# Patient Record
Sex: FEMALE | Race: BLACK OR AFRICAN AMERICAN | NOT HISPANIC OR LATINO | Employment: FULL TIME | URBAN - METROPOLITAN AREA
[De-identification: names, ages, dates, MRNs, and addresses within clinical notes are randomized per-mention and may not be internally consistent; named-entity substitution may affect disease eponyms.]

---

## 2018-06-19 ENCOUNTER — CLINICAL SUPPORT (OUTPATIENT)
Dept: OCCUPATIONAL MEDICINE | Facility: CLINIC | Age: 28
End: 2018-06-19

## 2018-06-19 DIAGNOSIS — Z02.1 ENCOUNTER FOR PRE-EMPLOYMENT EXAMINATION: Primary | ICD-10-CM

## 2018-06-19 PROCEDURE — 99499 UNLISTED E&M SERVICE: CPT | Mod: S$GLB,,, | Performed by: PREVENTIVE MEDICINE

## 2018-06-19 PROCEDURE — 86580 TB INTRADERMAL TEST: CPT | Mod: S$GLB,,, | Performed by: PREVENTIVE MEDICINE

## 2018-06-19 PROCEDURE — 90746 HEPB VACCINE 3 DOSE ADULT IM: CPT | Mod: S$GLB,,, | Performed by: PREVENTIVE MEDICINE

## 2024-08-02 DIAGNOSIS — G89.29 CHRONIC RIGHT SHOULDER PAIN: Primary | ICD-10-CM

## 2024-08-02 DIAGNOSIS — M25.511 CHRONIC RIGHT SHOULDER PAIN: Primary | ICD-10-CM

## 2024-08-07 ENCOUNTER — OFFICE VISIT (OUTPATIENT)
Dept: ORTHOPEDICS | Facility: CLINIC | Age: 34
End: 2024-08-07
Payer: COMMERCIAL

## 2024-08-07 DIAGNOSIS — M25.511 ACUTE PAIN OF RIGHT SHOULDER: Primary | ICD-10-CM

## 2024-08-07 PROCEDURE — 99204 OFFICE O/P NEW MOD 45 MIN: CPT | Mod: S$GLB,,,

## 2024-08-07 PROCEDURE — 97110 THERAPEUTIC EXERCISES: CPT | Mod: GP,S$GLB,,

## 2024-08-07 PROCEDURE — 99999 PR PBB SHADOW E&M-EST. PATIENT-LVL III: CPT | Mod: PBBFAC,,,

## 2024-08-07 RX ORDER — NAPROXEN 500 MG/1
500 TABLET ORAL 2 TIMES DAILY WITH MEALS
Qty: 60 TABLET | Refills: 2 | Status: SHIPPED | OUTPATIENT
Start: 2024-08-07

## 2024-08-21 ENCOUNTER — CLINICAL SUPPORT (OUTPATIENT)
Dept: REHABILITATION | Facility: HOSPITAL | Age: 34
End: 2024-08-21
Payer: MEDICAID

## 2024-08-21 DIAGNOSIS — M25.511 ACUTE PAIN OF RIGHT SHOULDER: ICD-10-CM

## 2024-08-21 DIAGNOSIS — R53.1 WEAKNESS: Primary | ICD-10-CM

## 2024-08-21 DIAGNOSIS — M25.60 STIFFNESS IN JOINT: ICD-10-CM

## 2024-08-21 PROCEDURE — 97530 THERAPEUTIC ACTIVITIES: CPT | Mod: PO

## 2024-08-21 PROCEDURE — 97165 OT EVAL LOW COMPLEX 30 MIN: CPT | Mod: PO

## 2024-08-21 NOTE — PLAN OF CARE
[facility]  Occupational Therapy - Shoulder Evaluation       Evaluation Date: 8/21/2024  Patient: Deanna Kim  YOB: 1990  Chart Number: [id number]  Referring Physician: Josephine Sánchez P*  Diagnosis:   1. Weakness        2. Acute pain of right shoulder  Ambulatory referral/consult to Physical/Occupational Therapy      3. Stiffness in joint            Visit #: 1 of 1  Plan of Care Certification Date: 10/18/24  Authorization Period: 8/7/24 - 12/31/24  Date of Return to MD: 9/7/24    Time In: 1000  Time Out: 1100  Total Billable Time: 60    Precautions:  Standard    Subjective     Reason for Referral: Deanna presents to therapy with a medical referral of M25.511 (ICD-10-CM) - Acute pain of right shoulder.   Involved Side: R  Dominant Side: Right  Date of Onset: 3 months   Mechanism of Injury: unsure of how it happened   History of Current Condition: Pt states that about 3 months ago she woke up with pain in her arm that hinders her from being able to sleep on her right side and pinches when she tries to dry herself with a towel. She went to the MD and received an xray that showed that there were no fractures or OA in her shoulder. She was prescribed OT to increase her ROM and strengthen her shoulder.  Surgical Procedure: n/a  Imaging: reviewed   Previous Therapy: N    Pain:  Functional Pain Scale Rating 0-10:   6/10 on average  6/10 at best  8/10 at worst  Location: superior and lateral shoulder   Description: Sharp  Activities Which Increase Pain: drying off back with towel, abduction   Activities Which Decrease Pain: pain medication     Functional Limitations/Social History:    Previous functional status includes: Independent with all ADLs.     Current FunctionalStatus   Home/Living environment : lives with their family      Limitation of Functional Status as follows:   ADLs/IADLs:     - Feeding: Independent     - Bathing: Independent     - Dressing: Independent     - Grooming:  "Independent     - Driving: Y     Leisure: n/a    Occupation:     Working presently: employed      Patient's Goals for Therapy: " To feel comfortable and move her arm like normal"    Past Medical History/Physical Systems Review:   No past medical history on file.  Deanna Kim  has no past surgical history on file.    Deanna has a current medication list which includes the following prescription(s): naproxen.    Review of patient's allergies indicates:  Not on File     Barriers:  Environmental Concerns/ Fall Risk:  None  Barriers to Learning: None   Cultural/Spiritual : None   Developmental/Education: None   Abuse/ Neglect: none   Nutritional Deficit: None   Language: None   Hearing/Vision Deficit: None   History of Cancer: N        Objective     Observation: No abnormalities present    UE Range of Motion  Passive Range of Motion:   Shoulder Right   Flexion 180   Abduction 160   ER at 90 50   IR 60      Active Range of Motion:   Shoulder Left Right   Flexion 180 180   Abduction 160 160   ER at 90 80 50   IR 70 60     Painful Arc:   Patient demonstrates painful arc in shoulder flexion or abduction.  Through Abduction: Elevation 130 Degrees    Upper Extremity Strength  (R) UE  (L) UE    Shoulder flexion: 4/5 Shoulder flexion: 5/5   Shoulder Abduction: 4/5 Shoulder abduction: 5/5   Shoulder ER 4/5 Shoulder ER 5/5   Shoulder IR 4/5 Shoulder IR 5/5   Rhomboids 4/5 Rhomboids 5/5            Joint Mobility/End Feels: Stiff    Palpation: Tenderness on the posterior aspect of the shoulder     Sensation: Denies numbness and tingling      Scapular Control/Dyskinesis:    Normal / Subtle / Obvious  Comments    Left  N N/a   Right  N N/a       OUTCOME MEASURE:FOTO    Category:Self care      Current Score  = 49%   Goal at Discharge Score = 71%    Treatment     Treatment Time In: 1045  Treatment Time Out:1100    Issued HEP and educated on modality use for pain management (see patient instructions). Pt verbally understood " the instructions as they were given and demonstrated proper form and technique during therapy. Pt was advise to perform these exercises free of pain, and to stop performing them if pain occurs.      Patient/Family Education: role of OT, goals for OT, scheduling/cancellations - pt verbalized understanding. Discussed insurance limitations with patient.      Assessment     Deanna Kim is a 33 y.o. female referred to outpatient occupational/hand therapy and presents with a medical diagnosis of M25.511 (ICD-10-CM) - Acute pain of right shoulder, resulting in pain, weakness, and stiffness and demonstrates limitations as described in the chart below. Following expanded medical record review it is determined that pt will benefit from occupational therapy services in order to maximize pain free and/or functional use of right shoulder. The following goals were discussed with the patient and patient is in agreement with them as to be addressed in the treatment plan. The patient's rehab potential is Good.     Anticipated barriers to occupational therapy: none at this time  Pt has no cultural, educational or language barriers to learning provided.    Profile and History Assessment of Occupational Performance Level of Clinical Decision Making Complexity Score   Occupational Profile:   Deanna Kim is a 33 y.o. female who lives with their family and is currently employed as bus moniter. Deanna Kim has difficulty with  Drying her back with a towel and sleeping  affecting his/her daily functional abilities. His/her main goal for therapy is to feel better.     Comorbidities:   See [facility]    Medical and Therapy History Review:   Expanded               Performance Deficits    Physical:  Joint Mobility  Joint Stability  Muscle Power/Strength  Muscle Endurance   Strength  Pinch Strength  Pain    Cognitive:  No Deficits    Psychosocial:    Habits  Routines  Rituals     Clinical Decision Making:  low    Assessment  Process:  Problem-Focused Assessments    Modification/Need for Assistance:  Minimal-Moderate Modifications/Assistance    Intervention Selection:  Several Treatment Options       low  Based on PMHX, co morbidities , data from assessments and functional level of assistance required with task and clinical presentation directly impacting function.         Goals:     Short Term (4 weeks):  1)   Patient to be IND with HEP and modalities for pain management  2)   Increase ROM to 80 degrees in external rotation of shoulder to increase functional UE use for ADLs, IADLs, and work tasks  3)   Increase ROM to 70 degrees in internal rotation of shoulder to increase functional UE use for ADLs, IADLs, and work tasks  4)   Improve muscle strength ing 4/5 to 4+/5 in order to complete task needed for ADLs, IADLs, and work.     Long Term (by discharge):  1)   Pt will report 1 out of 10 pain at worst with ADLs, IADLs, and work task.  2)   Patient to score at 71% or more on FOTO to demonstrate improved perception of functional arm use.  3)   Pt will return to prior level of function for ADLs and household management.     Plan     Pt to be treated by Occupational Therapy 2 times per week for 8 weeks during the certification period to achieve the established goals.     Treatment to include: Paraffin, Fluidotherapy, Manual therapy/joint mobilizations, Modalities for pain management, US 3 mhz, Therapeutic exercises/activities., Iontophoresis with 2.0 cc Dexamethasone, Strengthening, Orthotic Fabrication/Fit/Training, Edema Control, Scar Management, Wound Care, Electrical Modalities, Joint Protection, and Energy Conservation, as well as any other treatments deemed necessary based on the patient's needs or progress.       Zhanna Zee, OT

## 2024-08-28 ENCOUNTER — CLINICAL SUPPORT (OUTPATIENT)
Dept: REHABILITATION | Facility: HOSPITAL | Age: 34
End: 2024-08-28
Payer: MEDICAID

## 2024-08-28 DIAGNOSIS — M25.60 STIFFNESS IN JOINT: ICD-10-CM

## 2024-08-28 DIAGNOSIS — M25.511 ACUTE PAIN OF RIGHT SHOULDER: ICD-10-CM

## 2024-08-28 DIAGNOSIS — R53.1 WEAKNESS: Primary | ICD-10-CM

## 2024-08-28 PROCEDURE — 97530 THERAPEUTIC ACTIVITIES: CPT | Mod: PO

## 2024-08-28 NOTE — PROGRESS NOTES
Occupational Therapy Daily Treatment Note     Name: Deanna Kim  Clinic Number: [id number]    Therapy Diagnosis:   Encounter Diagnoses   Name Primary?    Weakness Yes    Acute pain of right shoulder     Stiffness in joint      Physician: Josephine Sánchez P*    Visit Date: 8/28/2024    Physician Orders: OT eval/treat   Medical Diagnosis: M25.511 (ICD-10-CM) - Acute pain of right shoulder  Surgical Procedure and Date: n/a  Evaluation Date: 8/21/24  Onset Date: 3 months  Insurance Authorization Period Expiration: 8/21/24 - 10/27/24  Plan of Care Certification Period: 10/18/24  Date of Return to MD: TBD  Visit # / Visits authorized: 1 / 14    FOTO Completion: 1/3    Time In:0900  Time Out: 1000  Total Billable Time: 60 minutes    Precautions:  Standard      Subjective     Pt reports: She has been completing her exercises   she was compliant with home exercise program given last session.   Response to previous treatment:1st after   Functional change: 1st after     Pain: 6/10  Location: right shoulder      Objective       Observation: No abnormalities present     UE Range of Motion  Passive Range of Motion:   Shoulder Right   Flexion 180   Abduction 160   ER at 90 50   IR 60      Active Range of Motion:   Shoulder Left Right   Flexion 180 180   Abduction 160 160   ER at 90 80 50   IR 70 60      Painful Arc:   Patient demonstrates painful arc in shoulder flexion or abduction.  Through Abduction: Elevation 130 Degrees     Upper Extremity Strength  (R) UE   (L) UE     Shoulder flexion: 4/5 Shoulder flexion: 5/5   Shoulder Abduction: 4/5 Shoulder abduction: 5/5   Shoulder ER 4/5 Shoulder ER 5/5   Shoulder IR 4/5 Shoulder IR 5/5   Rhomboids 4/5 Rhomboids 5/5                                               Joint Mobility/End Feels: Stiff     Palpation: Tenderness on the posterior aspect of the shoulder      Sensation: Denies numbness and tingling        Scapular Control/Dyskinesis:     Normal / Subtle / Obvious  Comments     Left  N N/a   Right  N N/a         OUTCOME MEASURE:FOTO     Category:Self care      Current Score  = 49%   Goal at Discharge Score = 71%    Treatment       ALBA received the following manual therapy techniques for 13 minutes:   -PROM of shoulder in all directions 2/10  - glenohumeral joint mobilizations grade 1-2 in anterior/posterior plane    ALBA participated in dynamic functional therapeutic activities to improve functional performance for 47  minutes, including:  -pulley's 3 minutes/3 minutes   - UBE level 3 5 minutes forward/ 5 minutes backwards   - green theraband rows 2/15  - green theraband pull downs 2/15  - green theraband ER walk outs 1/15  - green theraband IR walk outs 1/15  - ball on wall ABC's       Home Exercises and Education Provided     Education provided:     - Progress towards goals     Written Home Exercises Provided: Patient instructed to cont prior HEP.  Exercises were reviewed and ALBA was able to demonstrate them prior to the end of the session.  ALBA demonstrated good  understanding of the HEP provided.   .   See EMR under Patient Instructions for exercises provided prior visit.        Assessment     Pt would continue to benefit from skilled OT. Pt tolerated added therapeutic activities well without complaints of additional pain.  Pt is motivated. Will progress as tolerated.     ALBA is progressing well towards her goals and there are no updates to goals at this time. Pt prognosis is Good.     Pt will continue to benefit from skilled outpatient occupational therapy to address the deficits listed in the problem list on initial evaluation provide pt/family education and to maximize pt's level of independence in the home and community environment.     Anticipated barriers to occupational therapy: none at this time    Pt's spiritual, cultural and educational needs considered and pt agreeable to plan of care and goals.    Goals:      Short Term (4 weeks):  1)   Patient to be IND  with HEP and modalities for pain management Ongoing  2)   Increase ROM to 80 degrees in external rotation of shoulder to increase functional UE use for ADLs, IADLs, and work tasksOngoing  3)   Increase ROM to 70 degrees in internal rotation of shoulder to increase functional UE use for ADLs, IADLs, and work tasksOngoing  4)   Improve muscle strength ing 4/5 to 4+/5 in order to complete task needed for ADLs, IADLs, and work. Ongoing     Long Term (by discharge):  1)   Pt will report 1 out of 10 pain at worst with ADLs, IADLs, and work task. Ongoing  2)   Patient to score at 71% or more on FOTO to demonstrate improved perception of functional arm use. Ongoing  3)   Pt will return to prior level of function for ADLs and household management. Ongoing     Plan      Pt to be treated by Occupational Therapy 2 times per week for 8 weeks during the certification period to achieve the established goals.      Treatment to include: Paraffin, Fluidotherapy, Manual therapy/joint mobilizations, Modalities for pain management, US 3 mhz, Therapeutic exercises/activities., Iontophoresis with 2.0 cc Dexamethasone, Strengthening, Orthotic Fabrication/Fit/Training, Edema Control, Scar Management, Wound Care, Electrical Modalities, Joint Protection, and Energy Conservation, as well as any other treatments deemed necessary based on the patient's needs or progress.         Updates/Grading for next session: Continue with current plan of care       Zhanna Zee, OT

## 2024-09-18 ENCOUNTER — OFFICE VISIT (OUTPATIENT)
Dept: ORTHOPEDICS | Facility: CLINIC | Age: 34
End: 2024-09-18
Payer: MEDICAID

## 2024-09-18 DIAGNOSIS — M77.8 RIGHT SHOULDER TENDONITIS: ICD-10-CM

## 2024-09-18 DIAGNOSIS — M25.511 ACUTE PAIN OF RIGHT SHOULDER: Primary | ICD-10-CM

## 2024-09-18 PROCEDURE — 99999 PR PBB SHADOW E&M-EST. PATIENT-LVL II: CPT | Mod: PBBFAC,,,

## 2024-09-18 PROCEDURE — 99212 OFFICE O/P EST SF 10 MIN: CPT | Mod: PBBFAC,PN,25

## 2024-09-18 PROCEDURE — 99999PBSHW PR PBB SHADOW TECHNICAL ONLY FILED TO HB: Mod: PBBFAC,,,

## 2024-09-18 PROCEDURE — 20610 DRAIN/INJ JOINT/BURSA W/O US: CPT | Mod: PBBFAC,PN

## 2024-09-18 RX ORDER — TRIAMCINOLONE ACETONIDE 40 MG/ML
40 INJECTION, SUSPENSION INTRA-ARTICULAR; INTRAMUSCULAR
Status: DISCONTINUED | OUTPATIENT
Start: 2024-09-18 | End: 2024-09-18 | Stop reason: HOSPADM

## 2024-09-18 RX ADMIN — TRIAMCINOLONE ACETONIDE 40 MG: 40 INJECTION, SUSPENSION INTRA-ARTICULAR; INTRAMUSCULAR at 10:09

## 2024-09-18 NOTE — PROCEDURES
Large Joint Aspiration/Injection: R subacromial bursa    Date/Time: 9/18/2024 10:00 AM    Performed by: Josephine Sánchez PA-C  Authorized by: Josephine Sánchez PA-C    Consent Done?:  Yes (Verbal)  Indications:  Pain  Site marked: the procedure site was marked    Timeout: prior to procedure the correct patient, procedure, and site was verified      Local anesthesia used?: Yes    Local anesthetic:  Topical anesthetic    Details:  Needle Size:  22 G  Ultrasonic Guidance for needle placement?: No    Approach:  Posterior  Location:  Shoulder  Site:  R subacromial bursa  Medications:  40 mg triamcinolone acetonide 40 mg/mL  Patient tolerance:  Patient tolerated the procedure well with no immediate complications    Injection Procedure Note   Prior to procedure the correct patient, procedure, and site was verified. Allergies were reviewed and verbal consent was obtained. The procedure site was marked.    After time out was performed, the patient was prepped aseptically with chloraprep, the area was sprayed with local topical anesthetic, and then cleaned with alcohol. A therapeutic subacromial injection of combination of 2 cc 1% Xylocaine and 40mg Triamcinolone was given under sterile technique using a 22-gauge x 1.5 needle from the posterior aspect of the right shoulder. Sterile dressing applied.     Patient tolerated the procedure well. Pain decreased. She had no adverse reactions to the medication.     The patient is cautioned that immediate relief of pain is secondary to the local anesthetic and will be temporary. After the anesthetic wears off there may be a increase in pain that may last for a few hours or a few days. Advised patient to avoid strenuous activities for the next 24-48 hours and to apply ice for 20 minutes to help alleviate this this pain. She was warned of possible blood sugar and/or blood pressure changes following injection. She was reminded to call the clinic immediately for any adverse side  effects as explained in clinic today.

## 2024-09-18 NOTE — PROGRESS NOTES
Subjective:      Patient ID: Deanna Kim is a 34 y.o. female.    Chief Complaint: Pain of the Right Shoulder      HPI: Deanna Kim is a 34 y.o. right hand dominant female who presents to clinic for follow up of right shoulder pain. Patient was last seen by myself on 08/07/2024 for this.  Patient has been attending physical therapy, performing HEP, and taking previously prescribed Naproxen BID with notable improvement.  Patient does report some continued pain with certain maneuvers, particularly at end range of motion, and when trying to sleep on the shoulder at night.  Patient also reports some discomfort with overhead lifting.  Denies numbness, tingling, radiation, and inability to bear weight. Pain today is a 5/10.  She denies a history of trauma, but symptoms have been present for approximately 4 months. The pain is affecting ADLs and limiting desired level of activity.  Patient presents today for follow up.    Occupation:     Ambulating: unassisted  Diabetic:  No  Smoking:  She has never smoked.  History of DVT/PE: Negative      PAST MEDICAL HISTORY:    History reviewed. No pertinent past medical history.    MEDICATIONS:   Current Outpatient Medications:     naproxen (NAPROSYN) 500 MG tablet, Take 1 tablet (500 mg total) by mouth 2 (two) times daily with meals., Disp: 60 tablet, Rfl: 2    ALLERGIES:   Review of patient's allergies indicates:  Not on File    Review of Systems:  Constitution: Negative for chills, fever and night sweats.   HENT: Negative for congestion and headaches.    Eyes: Negative for blurred vision or vision loss.  Cardiovascular: Negative for chest pain and syncope.   Respiratory: Negative for cough and shortness of breath.    Endocrine: Negative for polydipsia, polyphagia and polyuria.   Hematologic/Lymphatic: Negative for bleeding problem. Does not bruise/bleed easily.   Skin: Negative for dry skin, itching and rash.   Musculoskeletal: See HPI.   Gastrointestinal: Negative  for abdominal pain and bowel incontinence.   Genitourinary: Negative for bladder incontinence and nocturia.   Neurological: Negative for disturbances in coordination, loss of balance and seizures.   Psychiatric/Behavioral: Negative for depression. The patient does not have insomnia.    Allergic/Immunologic: Negative for hives and persistent infections.          Objective:      There were no vitals filed for this visit.    PHYSICAL EXAM:  General: Alert & oriented x3, well-developed and well-nourished, in no acute distress, sitting comfortably in the exam room.  Skin: Warm and dry. Capillary refill less than 2 seconds.   Head: Normocephalic and atraumatic.   Eyes: Sclera appear normal.   Nose: No deformities seen.   Ears: No deformities seen.   Neck: No tracheal deviation present.   Pulmonary/Chest: Breathing unlabored.   Neurological: Alert and oriented to person, place, and time.   Psychiatric: Mood is pleasant and affect appropriate.       RIGHT SHOULDER        Inspection/Observation:   No evidence of swelling, redness, scars, visible deformity, or atrophy.         Palpation:    Mild tenderness to palpation over the AC joint.    No tenderness at the SC joint  No tenderness over the clavicle   No tenderness over biceps tendon or bicipital groove  No tenderness over subacromial space        Range of Motion:   Active Forward Flexion  180° *with pain at end range  Active Abduction   160° *with pain at end range  Active Internal Rotation  to T7 *with pain at end range  Active External Rotation   50°         Strength Testing:  Forward Flexion  5/5   Abduction   5/5   Internal Rotation  5/5   External Rotation  5/5         Special Tests:  Empty can test  Negative  Full can test   Negative  Resisted internal rotation Positive  Resisted external rotation Negative  Neer's test   Negative  Dove'-Allen test Positive        Neurovascular Exam Bilateral UEs:  Sensation intact to light touch in the distal median, radial,  and ulnar nerve distributions bilaterally.  Capillary refill intact <2 seconds in all digits bilaterally      Imaging:   X-Rays: 3 views of right shoulder dated 08/07/2024, and independently reviewed, show: No acute fractures or dislocations. Joint spaces are preserved. No evidence of foreign bodies.         Assessment:       1. Acute pain of right shoulder    2. Right shoulder tendonitis        Plan:       Orders Placed This Encounter    Large Joint Aspiration/Injection: R subacromial bursa     I explained the nature of the problem to the patient.     I discussed at length with the patient all the different treatment options available for her right shoulder including anti-inflammatories, acetaminophen, bracing, rest, ice, heat, occupational therapy, and corticosteroid injections. I explained the potential role of surgery in the treatment of this condition. The patient understands that if non-surgical measures do not adequately control symptoms, surgery will be considered in the future.       Medications:  Continue with previously prescribed Naproxen (Naprosyn) 500 mg BID for PRN pain management. Patient understands to take with food and/or OTC prilosec to decrease GI side effects. Advised patient to refrain from taking other anti-inflammatory medications while taking this medication, however she may alternate with acetaminophen as needed.  Physical Therapy:  Continue with previous referral to physical therapy for shoulder ROM, stretching, strengthening, and conditioning.  HEP:  Continue with previously instructed and demonstrated shoulder strengthening & ROM HEP.   Procedures:  Corticosteroid injection requested and performed today to the right shoulder. See procedure note. Standard precautions provided.  Pain Management: Ice compress to the affected area 2-3x a day for 15-20 minutes as needed for pain management.    Follow-Up: 6-8 weeks with Josephine Sánchez PA-C for virtual follow-up. Consider MRI if symptoms  persist.    All of the patient's questions were answered and the patient will contact us if they have any questions or concerns in the interim.    Josephine Sánchez PA-C  [facility]  Orthopedic Surgery    Medical Dictation software was used during the dictation of portions or the entirety of this medical record.  Phonetic or grammatic errors may exist due to the use of this software. For clarification, refer to the author of the document.

## 2024-09-24 ENCOUNTER — PATIENT MESSAGE (OUTPATIENT)
Dept: ORTHOPEDICS | Facility: CLINIC | Age: 34
End: 2024-09-24
Payer: MEDICAID

## 2024-11-13 NOTE — PROGRESS NOTES
Established Patient - Audio Only Telehealth Visit     The patient location is: Home  The chief complaint leading to consultation is: right should pain  Visit type: Virtual visit with audio only (telephone)  Total time spent with patient: 5 minutes      The reason for the audio only service rather than synchronous audio and video virtual visit was related to technical difficulties or patient preference/necessity.     Each patient to whom I provide medical services by telemedicine is:  (1) informed of the relationship between the physician and patient and the respective role of any other health care provider with respect to management of the patient; and (2) notified that they may decline to receive medical services by telemedicine and may withdraw from such care at any time. Patient verbally consented to receive this service via voice-only telephone call.      HPI: Deanna Kim is a 34 y.o. right hand dominant female who is following up for ongoing pain with her right shoulder. Patient was last seen by myself on 09/18/2024 for this issue and corticosteroid injection was performed.  Additional recent treatments include physical therapy, performing HEP, taking previously prescribed Naproxen BID with good relief.  Patient reports continued relief with previous corticosteroid injection and conservative treatment. Patient is reports some discomfort with certain maneuvers, but overall is happy with her progress.  She denies a history of trauma. Denies numbness, tingling, radiation, and inability to bear weight. The pain is affecting ADLs and limiting desired level of activity. Today, I called the patient to discuss her symptoms and progress.       Occupation:     Ambulating: unassisted  Diabetic:  No  Smoking:  She has never smoked.  History of DVT/PE: Negative      Assessment and Plan:    Right shoulder tendonitis [M77.8]  Chronic right shoulder pain [M25.511]      I explained the nature of the problem to the  patient.     I discussed at length with the patient all the different treatment options available for her right shoulder including anti-inflammatories, acetaminophen, bracing, rest, ice, heat, physical therapy, and corticosteroid injections. I explained the potential role of surgery in the treatment of this condition. The patient understands that if non-surgical measures do not adequately control symptoms, surgery will be considered in the future.     Patient would like to continue to treat conservatively at this time. She is please with her progress thus far.     Medications:  Continue with previously prescribed Naproxen (Naprosyn) 500 mg BID as needed for pain management.  Physical Therapy:  Continue with previous referral to physical therapy for shoulder ROM, stretching, strengthening, and conditioning.  HEP:  Continue with previously instructed and demonstrated a shoulder strengthening and ROM HEP.  Pain Management: Continue to apply ice compress to the affected area 2-3x a day for 15-20 minutes as needed for pain management.      Follow-Up: As needed. Consider MRI if symptoms return/worsen.     All of the patient's questions were answered and the patient will contact us if they have any questions or concerns in the interim.    Josephine Sánchez PA-C  [facility]  Orthopedic Surgery    Medical Dictation software was used during the dictation of portions or the entirety of this medical record.  Phonetic or grammatic errors may exist due to the use of this software. For clarification, refer to the author of the document.    This service was not originating from a related E/M service provided within the previous 7 days nor will  to an E/M service or procedure within the next 24 hours or my soonest available appointment.  Prevailing standard of care was able to be met in this audio-only visit.

## 2024-11-14 ENCOUNTER — OFFICE VISIT (OUTPATIENT)
Dept: ORTHOPEDICS | Facility: CLINIC | Age: 34
End: 2024-11-14
Payer: MEDICAID

## 2024-11-14 DIAGNOSIS — G89.29 CHRONIC RIGHT SHOULDER PAIN: ICD-10-CM

## 2024-11-14 DIAGNOSIS — M25.511 CHRONIC RIGHT SHOULDER PAIN: ICD-10-CM

## 2024-11-14 DIAGNOSIS — M77.8 RIGHT SHOULDER TENDONITIS: Primary | ICD-10-CM

## 2024-11-14 PROCEDURE — 1160F RVW MEDS BY RX/DR IN RCRD: CPT | Mod: CPTII,95,,

## 2024-11-14 PROCEDURE — 99213 OFFICE O/P EST LOW 20 MIN: CPT | Mod: 95,,,

## 2024-11-14 PROCEDURE — 1159F MED LIST DOCD IN RCRD: CPT | Mod: CPTII,95,,

## 2024-11-17 DIAGNOSIS — M25.511 ACUTE PAIN OF RIGHT SHOULDER: ICD-10-CM

## 2024-11-18 RX ORDER — NAPROXEN 500 MG/1
500 TABLET ORAL 2 TIMES DAILY WITH MEALS
Qty: 60 TABLET | Refills: 2 | Status: SHIPPED | OUTPATIENT
Start: 2024-11-18